# Patient Record
Sex: FEMALE | Employment: UNEMPLOYED | URBAN - METROPOLITAN AREA
[De-identification: names, ages, dates, MRNs, and addresses within clinical notes are randomized per-mention and may not be internally consistent; named-entity substitution may affect disease eponyms.]

---
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
## 2025-08-06 ENCOUNTER — TELEPHONE (OUTPATIENT)
Dept: OPTOMETRY | Facility: CLINIC | Age: 20
End: 2025-08-06
Payer: COMMERCIAL

## 2025-08-12 ENCOUNTER — ALLIED HEALTH/NURSE VISIT (OUTPATIENT)
Dept: OPTOMETRY | Facility: CLINIC | Age: 20
End: 2025-08-12
Payer: COMMERCIAL

## 2025-08-12 DIAGNOSIS — H52.203 MYOPIA OF BOTH EYES WITH ASTIGMATISM: Primary | ICD-10-CM

## 2025-08-12 DIAGNOSIS — H52.13 MYOPIA OF BOTH EYES WITH ASTIGMATISM: Primary | ICD-10-CM

## 2025-08-12 PROCEDURE — 99207 PR NO CHARGE LOS: CPT | Performed by: OPTOMETRIST

## 2025-08-12 ASSESSMENT — REFRACTION_CURRENTRX
OD_BASECURVE: 8.6
OD_SPHERE: -8.00
OS_BRAND: COOPER BIOFINITY TORIC BC 8.7, D 14.5
OD_BRAND: COOPER BIOFINITY BC 8.6, D 14.0
OD_CYLINDER: SPHERE
OD_DIAMETER: 14.0
OS_SPHERE: +1.25
OS_AXIS: 010
OS_CYLINDER: -2.25
OS_BASECURVE: 8.7
OS_DIAMETER: 14.5

## 2025-08-12 ASSESSMENT — VISUAL ACUITY
CORRECTION_TYPE: CONTACTS
METHOD: SNELLEN - LINEAR

## 2025-09-04 ENCOUNTER — ALLIED HEALTH/NURSE VISIT (OUTPATIENT)
Dept: OPTOMETRY | Facility: CLINIC | Age: 20
End: 2025-09-04
Payer: COMMERCIAL

## 2025-09-04 DIAGNOSIS — H52.31 ANISOMETROPIA: Primary | ICD-10-CM

## 2025-09-04 ASSESSMENT — VISUAL ACUITY
OS_CC: 20/20-2
OS_CC: 20/20
METHOD: SNELLEN - LINEAR
OD_CC: 20/60
OD_CC: 20/60
CORRECTION_TYPE: CONTACTS

## 2025-09-04 ASSESSMENT — REFRACTION_CURRENTRX
OS_BASECURVE: 8.7
OS_CYLINDER: -2.25
OS_BRAND: COOPER BIOFINITY TORIC BC 8.7, D 14.5
OD_SPHERE: -8.00
OS_SPHERE: +1.25
OS_DIAMETER: 14.5
OD_DIAMETER: 14.0
OD_BRAND: COOPER BIOFINITY BC 8.6, D 14.0
OS_AXIS: 010
OD_BASECURVE: 8.6
OD_CYLINDER: SPHERE